# Patient Record
Sex: FEMALE | URBAN - METROPOLITAN AREA
[De-identification: names, ages, dates, MRNs, and addresses within clinical notes are randomized per-mention and may not be internally consistent; named-entity substitution may affect disease eponyms.]

---

## 2018-08-13 ENCOUNTER — IMPORTED ENCOUNTER (OUTPATIENT)
Dept: URBAN - METROPOLITAN AREA CLINIC 50 | Facility: CLINIC | Age: 68
End: 2018-08-13

## 2020-01-24 NOTE — PROCEDURE NOTE: CLINICAL
PROCEDURE NOTE: Sub-Tenon’s Kenalog* #1 OS. Diagnosis: Cystoid Macular Edema. Anesthesia: Topical. Prep: Betadine Flush. Prior to injection, risks/benefits/alternatives discussed including infection, loss of vision, hemorrhage, cataract, glaucoma, elevated intraocular pressure and lid swelling. Betadine prep was performed. Sub-Tenon’s injection of Kenalog 40 mg/1 ml was given. The remainder of the Sub-Tenon’s kenalog was discarded in the medical waste. Time of injection: 1206P. Patient tolerated procedure well. There were no complications. Post-op instructions given. Patient given office phone number/answering service number and advised to call immediately should there be an increase in floaters or redness, loss of vision or pain, or should they have any other questions or concerns. Elle Moore